# Patient Record
Sex: MALE | Race: BLACK OR AFRICAN AMERICAN | URBAN - METROPOLITAN AREA
[De-identification: names, ages, dates, MRNs, and addresses within clinical notes are randomized per-mention and may not be internally consistent; named-entity substitution may affect disease eponyms.]

---

## 2020-07-15 ENCOUNTER — HISTORICAL (OUTPATIENT)
Dept: ADMINISTRATIVE | Facility: HOSPITAL | Age: 25
End: 2020-07-15

## 2022-04-10 ENCOUNTER — HISTORICAL (OUTPATIENT)
Dept: ADMINISTRATIVE | Facility: HOSPITAL | Age: 27
End: 2022-04-10

## 2022-04-26 VITALS
BODY MASS INDEX: 29.65 KG/M2 | SYSTOLIC BLOOD PRESSURE: 118 MMHG | OXYGEN SATURATION: 98 % | DIASTOLIC BLOOD PRESSURE: 78 MMHG | WEIGHT: 218.94 LBS | HEIGHT: 72 IN

## 2024-11-27 ENCOUNTER — OFFICE VISIT (OUTPATIENT)
Dept: FAMILY MEDICINE | Facility: CLINIC | Age: 29
End: 2024-11-27
Payer: COMMERCIAL

## 2024-11-27 VITALS
OXYGEN SATURATION: 100 % | HEIGHT: 73 IN | WEIGHT: 186.63 LBS | BODY MASS INDEX: 24.73 KG/M2 | SYSTOLIC BLOOD PRESSURE: 128 MMHG | HEART RATE: 59 BPM | TEMPERATURE: 98 F | DIASTOLIC BLOOD PRESSURE: 76 MMHG

## 2024-11-27 DIAGNOSIS — Z00.00 WELL ADULT EXAM: Primary | ICD-10-CM

## 2024-11-27 PROCEDURE — 1159F MED LIST DOCD IN RCRD: CPT | Mod: CPTII,,, | Performed by: FAMILY MEDICINE

## 2024-11-27 PROCEDURE — 3008F BODY MASS INDEX DOCD: CPT | Mod: CPTII,,, | Performed by: FAMILY MEDICINE

## 2024-11-27 PROCEDURE — 3078F DIAST BP <80 MM HG: CPT | Mod: CPTII,,, | Performed by: FAMILY MEDICINE

## 2024-11-27 PROCEDURE — 3074F SYST BP LT 130 MM HG: CPT | Mod: CPTII,,, | Performed by: FAMILY MEDICINE

## 2024-11-27 PROCEDURE — 99385 PREV VISIT NEW AGE 18-39: CPT | Mod: ,,, | Performed by: FAMILY MEDICINE

## 2024-11-27 NOTE — ASSESSMENT & PLAN NOTE
Healthy lifestyle choices discussed including regular exercise     Check labs today and call with results

## 2024-11-27 NOTE — PROGRESS NOTES
"SUBJECTIVE:  HPI    Terrence Jay is a 29 y.o. male here for Annual Exam.     No current problems or concerns.  Doing very well.  No chest pain or shortness on breath.  No abdominal pain.    Currently working as a  for a construction company.    Robs allergies, medications, history, and problem list were updated as appropriate.    ROS:  Pertinent ROS as above, otherwise negative 12 point review of systems    OBJECTIVE:  Vital signs  Visit Vitals  /76 (BP Location: Left arm, Patient Position: Sitting)   Pulse (!) 59   Temp 97.5 °F (36.4 °C) (Temporal)   Ht 6' 0.84" (1.85 m)   Wt 84.6 kg (186 lb 9.6 oz)   SpO2 100%   BMI 24.73 kg/m²          PHYSICAL EXAM:  General: Awake, alert, no acute distress  Neck:  No bruits, no masses  Cardiovascular:  Regular rhythm.  Normal rate.  No murmurs.  Respiratory: Clear to auscultation bilaterally, normal effort  Abdomen: Soft, nontender, nondistended, no hepatosplenomegaly   Extremities:  No cyanosis, no clubbing, no edema  Skin:  No rashes or appreciable lesions   Neuro:  Cranial nerves 2-12 are intact with usual testing.  Gait is normal.  Moves all 4 extremities equally and symmetrically.  Psychiatric:  Normal mood and affect.      ASSESSMENT/PLAN:  1. Well adult exam  Assessment & Plan:  Healthy lifestyle choices discussed including regular exercise     Check labs today and call with results    Orders:  -     CBC Auto Differential; Future; Expected date: 11/27/2024  -     Comprehensive Metabolic Panel; Future; Expected date: 11/27/2024  -     Lipid Panel; Future; Expected date: 12/27/2024  -     TSH; Future; Expected date: 11/27/2024      Follow Up:  Follow up in about 3 years (around 11/27/2027) for Well Adult, Fasting labs.          "

## 2024-11-28 LAB
ALBUMIN SERPL-MCNC: 5.1 G/DL (ref 4.3–5.2)
ALP SERPL-CCNC: 86 IU/L (ref 44–121)
ALT SERPL-CCNC: 25 IU/L (ref 0–44)
AST SERPL-CCNC: 25 IU/L (ref 0–40)
BASOPHILS # BLD AUTO: 0 X10E3/UL (ref 0–0.2)
BASOPHILS NFR BLD AUTO: 1 %
BILIRUB SERPL-MCNC: 0.8 MG/DL (ref 0–1.2)
BUN SERPL-MCNC: 18 MG/DL (ref 6–20)
BUN/CREAT SERPL: 16 (ref 9–20)
CALCIUM SERPL-MCNC: 9.8 MG/DL (ref 8.7–10.2)
CHLORIDE SERPL-SCNC: 101 MMOL/L (ref 96–106)
CHOLEST SERPL-MCNC: 196 MG/DL (ref 100–199)
CO2 SERPL-SCNC: 22 MMOL/L (ref 20–29)
CREAT SERPL-MCNC: 1.12 MG/DL (ref 0.76–1.27)
EOSINOPHIL # BLD AUTO: 0.1 X10E3/UL (ref 0–0.4)
EOSINOPHIL NFR BLD AUTO: 2 %
ERYTHROCYTE [DISTWIDTH] IN BLOOD BY AUTOMATED COUNT: 12.4 % (ref 11.6–15.4)
EST. GFR  (NO RACE VARIABLE): 92 ML/MIN/1.73
GLOBULIN SER CALC-MCNC: 2.6 G/DL (ref 1.5–4.5)
GLUCOSE SERPL-MCNC: 74 MG/DL (ref 70–99)
HCT VFR BLD AUTO: 40.6 % (ref 37.5–51)
HDLC SERPL-MCNC: 44 MG/DL
HGB BLD-MCNC: 13.1 G/DL (ref 13–17.7)
IMM GRANULOCYTES NFR BLD AUTO: 0 %
LDLC SERPL CALC-MCNC: 132 MG/DL (ref 0–99)
LYMPHOCYTES # BLD AUTO: 3 X10E3/UL (ref 0.7–3.1)
LYMPHOCYTES NFR BLD AUTO: 47 %
MCH RBC QN AUTO: 27.7 PG (ref 26.6–33)
MCHC RBC AUTO-ENTMCNC: 32.3 G/DL (ref 31.5–35.7)
MCV RBC AUTO: 86 FL (ref 79–97)
MONOCYTES # BLD AUTO: 0.5 X10E3/UL (ref 0.1–0.9)
MONOCYTES NFR BLD AUTO: 8 %
NEUTROPHILS # BLD AUTO: 2.7 X10E3/UL (ref 1.4–7)
NEUTROPHILS NFR BLD AUTO: 42 %
PLATELET # BLD AUTO: 251 X10E3/UL (ref 150–450)
POTASSIUM SERPL-SCNC: 4.3 MMOL/L (ref 3.5–5.2)
PROT SERPL-MCNC: 7.7 G/DL (ref 6–8.5)
RBC # BLD AUTO: 4.73 X10E6/UL (ref 4.14–5.8)
SODIUM SERPL-SCNC: 142 MMOL/L (ref 134–144)
TRIGL SERPL-MCNC: 108 MG/DL (ref 0–149)
TSH SERPL DL<=0.005 MIU/L-ACNC: 1.64 UIU/ML (ref 0.45–4.5)
VLDLC SERPL CALC-MCNC: 20 MG/DL (ref 5–40)
WBC # BLD AUTO: 6.5 X10E3/UL (ref 3.4–10.8)

## 2024-12-02 ENCOUNTER — TELEPHONE (OUTPATIENT)
Dept: FAMILY MEDICINE | Facility: CLINIC | Age: 29
End: 2024-12-02
Payer: COMMERCIAL

## 2025-04-02 ENCOUNTER — OFFICE VISIT (OUTPATIENT)
Dept: FAMILY MEDICINE | Facility: CLINIC | Age: 30
End: 2025-04-02
Payer: COMMERCIAL

## 2025-04-02 VITALS
DIASTOLIC BLOOD PRESSURE: 80 MMHG | TEMPERATURE: 100 F | OXYGEN SATURATION: 99 % | HEIGHT: 73 IN | BODY MASS INDEX: 25.16 KG/M2 | HEART RATE: 101 BPM | SYSTOLIC BLOOD PRESSURE: 130 MMHG | WEIGHT: 189.81 LBS

## 2025-04-02 DIAGNOSIS — M25.562 ACUTE PAIN OF LEFT KNEE: ICD-10-CM

## 2025-04-02 DIAGNOSIS — M70.52 INFRAPATELLAR BURSITIS OF LEFT KNEE: Primary | ICD-10-CM

## 2025-04-02 PROCEDURE — 3079F DIAST BP 80-89 MM HG: CPT | Mod: CPTII,,, | Performed by: FAMILY MEDICINE

## 2025-04-02 PROCEDURE — 99214 OFFICE O/P EST MOD 30 MIN: CPT | Mod: ,,, | Performed by: FAMILY MEDICINE

## 2025-04-02 PROCEDURE — 3075F SYST BP GE 130 - 139MM HG: CPT | Mod: CPTII,,, | Performed by: FAMILY MEDICINE

## 2025-04-02 PROCEDURE — 1160F RVW MEDS BY RX/DR IN RCRD: CPT | Mod: CPTII,,, | Performed by: FAMILY MEDICINE

## 2025-04-02 PROCEDURE — 1159F MED LIST DOCD IN RCRD: CPT | Mod: CPTII,,, | Performed by: FAMILY MEDICINE

## 2025-04-02 PROCEDURE — 3008F BODY MASS INDEX DOCD: CPT | Mod: CPTII,,, | Performed by: FAMILY MEDICINE

## 2025-04-02 RX ORDER — PREDNISONE 10 MG/1
40 TABLET ORAL DAILY
Qty: 28 TABLET | Refills: 0 | Status: SHIPPED | OUTPATIENT
Start: 2025-04-02 | End: 2025-04-09

## 2025-04-02 NOTE — PROGRESS NOTES
"SUBJECTIVE:  HPI    Terrence Jay is a 29 y.o. male here for Knee Pain (Left ) and Joint Swelling (Left knee).   History of Present Illness    CHIEF COMPLAINT:  Patient presents today with acute knee swelling and pain.    HISTORY OF PRESENT ILLNESS:  He reports acute onset of left knee swelling starting at 3 AM with associated difficulty walking and pain with knee flexion. Ice application has not provided improvement. He denies fever. He has been participating in [employer] classes which involved prolonged pressure on his knees from ground positions. While he experienced discomfort during these positions, he denies any acute pain during or immediately after classes. He denies any specific trauma prior to symptom onset.    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 Zavala, which persists but has been asymptomatic.            Robs allergies, medications, history, and problem list were updated as appropriate.    ROS:  Pertinent ROS as above, otherwise negative    OBJECTIVE:  Vital signs  Visit Vitals  /80 (BP Location: Left arm, Patient Position: Sitting)   Pulse 101   Temp 99.8 °F (37.7 °C) (Temporal)   Ht 6' 0.84" (1.85 m)   Wt 86.1 kg (189 lb 12.8 oz)   SpO2 99%   BMI 25.15 kg/m²          PHYSICAL EXAM:  General: Awake, alert, no acute distress  Right knee:  Subtle swelling in the infrapatellar region of the right knee.  However, nontender to palpation.  Normal range of motion.  No appreciable pathologic laxity.  Left knee:  Moderate to severe infrapatellar swelling with exquisite tenderness to palpation and mild warmth to the touch.  Decreased range of motion with flexion and extension of the left knee.  No appreciable pathologic laxity.      X-ray left knee, my interpretation prior to radiology report:  Lateral standing view shows a linear defect in the superior aspect of the tibial tuberosity of questionable significance.  Generalized soft tissue " swelling in the region of the tibial tuberosity with a moderate sized effusion present        ASSESSMENT/PLAN:  1. Infrapatellar bursitis of left knee  Assessment & Plan:  We will check some labs today to include a CBC, CMP, uric acid, sed rate, CRP     Empiric prednisone 40 mg daily for 7 days     Ice 15 minutes 3 times a day for 3 days     Range of motion     Return to clinic in 2 weeks    Orders:  -     Cancel: X-Ray Knee 3 View Right; Future; Expected date: 04/02/2025  -     X-Ray Knee 3 View Left; Future; Expected date: 04/02/2025  -     C-Reactive Protein; Future; Expected date: 04/02/2025  -     Sedimentation rate; Future; Expected date: 04/02/2025  -     Uric Acid; Future; Expected date: 04/02/2025  -     Comprehensive Metabolic Panel; Future; Expected date: 04/02/2025  -     CBC Auto Differential; Future; Expected date: 04/02/2025  -     predniSONE (DELTASONE) 10 MG tablet; Take 4 tablets (40 mg total) by mouth once daily. for 7 days  Dispense: 28 tablet; Refill: 0    2. Acute pain of left knee  -     Cancel: X-Ray Knee 3 View Right; Future; Expected date: 04/02/2025  -     X-Ray Knee 3 View Left; Future; Expected date: 04/02/2025      Follow Up:  Follow up in about 2 weeks (around 4/16/2025) for Left knee pain.        This note was generated with the assistance of ambient listening technology. Verbal consent was obtained by the patient and accompanying visitor(s) for the recording of patient appointment to facilitate this note. I attest to having reviewed and edited the generated note for accuracy, though some syntax or spelling errors may persist. Please contact the author of this note for any clarification.

## 2025-04-02 NOTE — ASSESSMENT & PLAN NOTE
We will check some labs today to include a CBC, CMP, uric acid, sed rate, CRP     Empiric prednisone 40 mg daily for 7 days     Ice 15 minutes 3 times a day for 3 days     Range of motion     Return to clinic in 2 weeks

## 2025-04-09 ENCOUNTER — OFFICE VISIT (OUTPATIENT)
Dept: FAMILY MEDICINE | Facility: CLINIC | Age: 30
End: 2025-04-09
Payer: COMMERCIAL

## 2025-04-09 VITALS
BODY MASS INDEX: 25.34 KG/M2 | WEIGHT: 191.19 LBS | OXYGEN SATURATION: 99 % | DIASTOLIC BLOOD PRESSURE: 74 MMHG | TEMPERATURE: 99 F | HEIGHT: 73 IN | HEART RATE: 72 BPM | SYSTOLIC BLOOD PRESSURE: 112 MMHG

## 2025-04-09 DIAGNOSIS — M70.52 INFRAPATELLAR BURSITIS OF LEFT KNEE: Primary | ICD-10-CM

## 2025-04-09 PROBLEM — M25.562 ACUTE PAIN OF LEFT KNEE: Status: RESOLVED | Noted: 2025-04-02 | Resolved: 2025-04-09

## 2025-04-09 PROCEDURE — 3074F SYST BP LT 130 MM HG: CPT | Mod: CPTII,,, | Performed by: FAMILY MEDICINE

## 2025-04-09 PROCEDURE — 3008F BODY MASS INDEX DOCD: CPT | Mod: CPTII,,, | Performed by: FAMILY MEDICINE

## 2025-04-09 PROCEDURE — 1159F MED LIST DOCD IN RCRD: CPT | Mod: CPTII,,, | Performed by: FAMILY MEDICINE

## 2025-04-09 PROCEDURE — 99214 OFFICE O/P EST MOD 30 MIN: CPT | Mod: ,,, | Performed by: FAMILY MEDICINE

## 2025-04-09 PROCEDURE — 3078F DIAST BP <80 MM HG: CPT | Mod: CPTII,,, | Performed by: FAMILY MEDICINE

## 2025-04-09 PROCEDURE — 1160F RVW MEDS BY RX/DR IN RCRD: CPT | Mod: CPTII,,, | Performed by: FAMILY MEDICINE

## 2025-04-09 NOTE — ASSESSMENT & PLAN NOTE
Improved with steroids    With recurrent swelling and patient's activity level, he is interested in pursuing bursectomy    Refer to Orthopedics

## 2025-04-09 NOTE — PROGRESS NOTES
"SUBJECTIVE:  HPI    Terrence Jay is a 29 y.o. male here for F/U for LFT Knee Pain .   History of Present Illness    CHIEF COMPLAINT:  Patient presents today for follow up of knee swelling.    KNEE PAIN AND SWELLING:  Here for follow-up as per office note dated April 2, 2025.  He was started on prednisone 40 mg a day for 7 days because of significant left infrapatellar bursa swelling.    He reports improvement in left knee condition starting around the fourth or fifth day of oral steroids, noting a release in pressure and decreased tenderness to touch. His right knee has historically been more symptomatic than the left, though current swelling is unprecedented.     MEDICAL HISTORY:  He has a heat allergy condition causing body swelling, particularly affecting his fingers and other extremities, which thicken when exposed to heat and sweat.            Robs allergies, medications, history, and problem list were updated as appropriate.    ROS:  Pertinent ROS as above, otherwise negative    OBJECTIVE:  Vital signs  Visit Vitals  /74 (BP Location: Left arm, Patient Position: Sitting)   Pulse 72   Temp 98.7 °F (37.1 °C) (Temporal)   Ht 6' 0.84" (1.85 m)   Wt 86.7 kg (191 lb 3.2 oz)   SpO2 99%   BMI 25.34 kg/m²          PHYSICAL EXAM:  General: Awake, alert, no acute distress  Left knee:  Significant reduction in infrapatellar bursa swelling but still present mild-to-moderately.  Resolution of tenderness to palpation over the infrapatellar bursa.  Improved range of motion.  Right knee: Mild right infrapatellar bursa swelling without tenderness.  Normal range of motion.    His lab workup from his previous visit was unremarkable except for a mild elevation in white blood cell count of 11.9.  Sed rate, CRP, uric acid level all unremarkable    ASSESSMENT/PLAN:  1. Infrapatellar bursitis of left knee  Assessment & Plan:  Improved with steroids    With recurrent swelling and patient's activity level, he is interested " in pursuing bursectomy    Refer to Orthopedics    Orders:  -     Cancel: Ambulatory referral/consult to Orthopedics; Future; Expected date: 04/16/2025  -     Ambulatory referral/consult to Orthopedics; Future; Expected date: 04/16/2025      This note was generated with the assistance of ambient listening technology. Verbal consent was obtained by the patient and accompanying visitor(s) for the recording of patient appointment to facilitate this note. I attest to having reviewed and edited the generated note for accuracy, though some syntax or spelling errors may persist. Please contact the author of this note for any clarification.

## 2025-04-11 ENCOUNTER — TELEPHONE (OUTPATIENT)
Dept: FAMILY MEDICINE | Facility: CLINIC | Age: 30
End: 2025-04-11
Payer: COMMERCIAL

## 2025-04-17 LAB
ALBUMIN SERPL-MCNC: 4.9 G/DL (ref 4.3–5.2)
ALP SERPL-CCNC: 90 IU/L (ref 44–121)
ALT SERPL-CCNC: 22 IU/L (ref 0–44)
AST SERPL-CCNC: 27 IU/L (ref 0–40)
BASOPHILS # BLD AUTO: 0 X10E3/UL (ref 0–0.2)
BASOPHILS NFR BLD AUTO: 0 %
BILIRUB SERPL-MCNC: 1 MG/DL (ref 0–1.2)
BUN SERPL-MCNC: 15 MG/DL (ref 6–20)
BUN/CREAT SERPL: 14 (ref 9–20)
CALCIUM SERPL-MCNC: 9.2 MG/DL (ref 8.7–10.2)
CHLORIDE SERPL-SCNC: 103 MMOL/L (ref 96–106)
CO2 SERPL-SCNC: 21 MMOL/L (ref 20–29)
CREAT SERPL-MCNC: 1.1 MG/DL (ref 0.76–1.27)
CRP SERPL-MCNC: 3 MG/L (ref 0–10)
EOSINOPHIL # BLD AUTO: 0 X10E3/UL (ref 0–0.4)
EOSINOPHIL NFR BLD AUTO: 0 %
ERYTHROCYTE [DISTWIDTH] IN BLOOD BY AUTOMATED COUNT: 12.1 % (ref 11.6–15.4)
ERYTHROCYTE [SEDIMENTATION RATE] IN BLOOD BY WESTERGREN METHOD: 7 MM/HR (ref 0–15)
EST. GFR  (NO RACE VARIABLE): 94 ML/MIN/1.73
GLOBULIN SER CALC-MCNC: 2.4 G/DL (ref 1.5–4.5)
GLUCOSE SERPL-MCNC: 82 MG/DL (ref 70–99)
HCT VFR BLD AUTO: 39.4 % (ref 37.5–51)
HGB BLD-MCNC: 12.5 G/DL (ref 13–17.7)
IMM GRANULOCYTES NFR BLD AUTO: 0 %
LYMPHOCYTES # BLD AUTO: 2.4 X10E3/UL (ref 0.7–3.1)
LYMPHOCYTES NFR BLD AUTO: 21 %
MCH RBC QN AUTO: 27.6 PG (ref 26.6–33)
MCHC RBC AUTO-ENTMCNC: 31.7 G/DL (ref 31.5–35.7)
MCV RBC AUTO: 87 FL (ref 79–97)
MONOCYTES # BLD AUTO: 0.9 X10E3/UL (ref 0.1–0.9)
MONOCYTES NFR BLD AUTO: 7 %
NEUTROPHILS # BLD AUTO: 8.2 X10E3/UL (ref 1.4–7)
NEUTROPHILS NFR BLD AUTO: 72 %
PLATELET # BLD AUTO: 259 X10E3/UL (ref 150–450)
POTASSIUM SERPL-SCNC: 4 MMOL/L (ref 3.5–5.2)
PROT SERPL-MCNC: 7.3 G/DL (ref 6–8.5)
RBC # BLD AUTO: 4.53 X10E6/UL (ref 4.14–5.8)
SODIUM SERPL-SCNC: 141 MMOL/L (ref 134–144)
URATE SERPL-MCNC: 6.9 MG/DL (ref 3.8–8.4)
WBC # BLD AUTO: 11.5 X10E3/UL (ref 3.4–10.8)